# Patient Record
Sex: MALE | Race: WHITE | NOT HISPANIC OR LATINO | URBAN - METROPOLITAN AREA
[De-identification: names, ages, dates, MRNs, and addresses within clinical notes are randomized per-mention and may not be internally consistent; named-entity substitution may affect disease eponyms.]

---

## 2022-08-04 ENCOUNTER — EMERGENCY (EMERGENCY)
Facility: HOSPITAL | Age: 47
LOS: 1 days | Discharge: HOME | End: 2022-08-04
Attending: EMERGENCY MEDICINE
Payer: COMMERCIAL

## 2022-08-04 VITALS
WEIGHT: 220.02 LBS | TEMPERATURE: 100 F | OXYGEN SATURATION: 95 % | SYSTOLIC BLOOD PRESSURE: 142 MMHG | DIASTOLIC BLOOD PRESSURE: 95 MMHG | HEIGHT: 71 IN | HEART RATE: 100 BPM | RESPIRATION RATE: 18 BRPM

## 2022-08-05 LAB
ALBUMIN SERPL ELPH-MCNC: 4.8 G/DL — SIGNIFICANT CHANGE UP (ref 3.3–5)
ALP SERPL-CCNC: 117 U/L — SIGNIFICANT CHANGE UP (ref 40–120)
ALT FLD-CCNC: 76 U/L — HIGH (ref 10–45)
ANION GAP SERPL CALC-SCNC: 10 MMOL/L — SIGNIFICANT CHANGE UP (ref 5–17)
ANION GAP SERPL CALC-SCNC: 11 MMOL/L — SIGNIFICANT CHANGE UP (ref 5–17)
ANION GAP SERPL CALC-SCNC: 14 MMOL/L — SIGNIFICANT CHANGE UP (ref 5–17)
APPEARANCE UR: CLEAR — SIGNIFICANT CHANGE UP
AST SERPL-CCNC: 25 U/L — SIGNIFICANT CHANGE UP (ref 10–40)
BACTERIA # UR AUTO: NEGATIVE — SIGNIFICANT CHANGE UP
BASOPHILS # BLD AUTO: 0.02 K/UL — SIGNIFICANT CHANGE UP (ref 0–0.2)
BASOPHILS NFR BLD AUTO: 0.1 % — SIGNIFICANT CHANGE UP (ref 0–2)
BILIRUB SERPL-MCNC: 1.5 MG/DL — HIGH (ref 0.2–1.2)
BILIRUB UR-MCNC: NEGATIVE — SIGNIFICANT CHANGE UP
BUN SERPL-MCNC: 18 MG/DL — SIGNIFICANT CHANGE UP (ref 7–23)
BUN SERPL-MCNC: 18 MG/DL — SIGNIFICANT CHANGE UP (ref 7–23)
BUN SERPL-MCNC: 24 MG/DL — HIGH (ref 7–23)
CALCIUM SERPL-MCNC: 8.8 MG/DL — SIGNIFICANT CHANGE UP (ref 8.4–10.5)
CALCIUM SERPL-MCNC: 9 MG/DL — SIGNIFICANT CHANGE UP (ref 8.4–10.5)
CALCIUM SERPL-MCNC: 9.5 MG/DL — SIGNIFICANT CHANGE UP (ref 8.4–10.5)
CHLORIDE SERPL-SCNC: 100 MMOL/L — SIGNIFICANT CHANGE UP (ref 96–108)
CHLORIDE SERPL-SCNC: 101 MMOL/L — SIGNIFICANT CHANGE UP (ref 96–108)
CHLORIDE SERPL-SCNC: 102 MMOL/L — SIGNIFICANT CHANGE UP (ref 96–108)
CO2 SERPL-SCNC: 25 MMOL/L — SIGNIFICANT CHANGE UP (ref 22–31)
CO2 SERPL-SCNC: 27 MMOL/L — SIGNIFICANT CHANGE UP (ref 22–31)
CO2 SERPL-SCNC: 27 MMOL/L — SIGNIFICANT CHANGE UP (ref 22–31)
COLOR SPEC: YELLOW — SIGNIFICANT CHANGE UP
CREAT SERPL-MCNC: 2.02 MG/DL — HIGH (ref 0.5–1.3)
CREAT SERPL-MCNC: 2.08 MG/DL — HIGH (ref 0.5–1.3)
CREAT SERPL-MCNC: 2.17 MG/DL — HIGH (ref 0.5–1.3)
DIFF PNL FLD: NEGATIVE — SIGNIFICANT CHANGE UP
EGFR: 37 ML/MIN/1.73M2 — LOW
EGFR: 39 ML/MIN/1.73M2 — LOW
EGFR: 40 ML/MIN/1.73M2 — LOW
EOSINOPHIL # BLD AUTO: 0.01 K/UL — SIGNIFICANT CHANGE UP (ref 0–0.5)
EOSINOPHIL NFR BLD AUTO: 0.1 % — SIGNIFICANT CHANGE UP (ref 0–6)
EPI CELLS # UR: 0 /HPF — SIGNIFICANT CHANGE UP
GLUCOSE SERPL-MCNC: 126 MG/DL — HIGH (ref 70–99)
GLUCOSE SERPL-MCNC: 141 MG/DL — HIGH (ref 70–99)
GLUCOSE SERPL-MCNC: 159 MG/DL — HIGH (ref 70–99)
GLUCOSE UR QL: ABNORMAL
HCT VFR BLD CALC: 46.7 % — SIGNIFICANT CHANGE UP (ref 39–50)
HGB BLD-MCNC: 15.6 G/DL — SIGNIFICANT CHANGE UP (ref 13–17)
HYALINE CASTS # UR AUTO: 1 /LPF — SIGNIFICANT CHANGE UP (ref 0–2)
IMM GRANULOCYTES NFR BLD AUTO: 0.5 % — SIGNIFICANT CHANGE UP (ref 0–1.5)
KETONES UR-MCNC: ABNORMAL
LEUKOCYTE ESTERASE UR-ACNC: NEGATIVE — SIGNIFICANT CHANGE UP
LYMPHOCYTES # BLD AUTO: 1.52 K/UL — SIGNIFICANT CHANGE UP (ref 1–3.3)
LYMPHOCYTES # BLD AUTO: 11 % — LOW (ref 13–44)
MCHC RBC-ENTMCNC: 30.5 PG — SIGNIFICANT CHANGE UP (ref 27–34)
MCHC RBC-ENTMCNC: 33.4 GM/DL — SIGNIFICANT CHANGE UP (ref 32–36)
MCV RBC AUTO: 91.4 FL — SIGNIFICANT CHANGE UP (ref 80–100)
MONOCYTES # BLD AUTO: 0.69 K/UL — SIGNIFICANT CHANGE UP (ref 0–0.9)
MONOCYTES NFR BLD AUTO: 5 % — SIGNIFICANT CHANGE UP (ref 2–14)
NEUTROPHILS # BLD AUTO: 11.56 K/UL — HIGH (ref 1.8–7.4)
NEUTROPHILS NFR BLD AUTO: 83.3 % — HIGH (ref 43–77)
NITRITE UR-MCNC: NEGATIVE — SIGNIFICANT CHANGE UP
NRBC # BLD: 0 /100 WBCS — SIGNIFICANT CHANGE UP (ref 0–0)
PH UR: 6 — SIGNIFICANT CHANGE UP (ref 5–8)
PLATELET # BLD AUTO: 183 K/UL — SIGNIFICANT CHANGE UP (ref 150–400)
POTASSIUM SERPL-MCNC: 3.5 MMOL/L — SIGNIFICANT CHANGE UP (ref 3.5–5.3)
POTASSIUM SERPL-MCNC: 3.6 MMOL/L — SIGNIFICANT CHANGE UP (ref 3.5–5.3)
POTASSIUM SERPL-MCNC: 3.6 MMOL/L — SIGNIFICANT CHANGE UP (ref 3.5–5.3)
POTASSIUM SERPL-SCNC: 3.5 MMOL/L — SIGNIFICANT CHANGE UP (ref 3.5–5.3)
POTASSIUM SERPL-SCNC: 3.6 MMOL/L — SIGNIFICANT CHANGE UP (ref 3.5–5.3)
POTASSIUM SERPL-SCNC: 3.6 MMOL/L — SIGNIFICANT CHANGE UP (ref 3.5–5.3)
PROT SERPL-MCNC: 7 G/DL — SIGNIFICANT CHANGE UP (ref 6–8.3)
PROT UR-MCNC: ABNORMAL
RBC # BLD: 5.11 M/UL — SIGNIFICANT CHANGE UP (ref 4.2–5.8)
RBC # FLD: 12.6 % — SIGNIFICANT CHANGE UP (ref 10.3–14.5)
RBC CASTS # UR COMP ASSIST: 1 /HPF — SIGNIFICANT CHANGE UP (ref 0–4)
SARS-COV-2 RNA SPEC QL NAA+PROBE: SIGNIFICANT CHANGE UP
SODIUM SERPL-SCNC: 139 MMOL/L — SIGNIFICANT CHANGE UP (ref 135–145)
SP GR SPEC: 1.03 — HIGH (ref 1.01–1.02)
UROBILINOGEN FLD QL: NEGATIVE — SIGNIFICANT CHANGE UP
WBC # BLD: 13.87 K/UL — HIGH (ref 3.8–10.5)
WBC # FLD AUTO: 13.87 K/UL — HIGH (ref 3.8–10.5)
WBC UR QL: 1 /HPF — SIGNIFICANT CHANGE UP (ref 0–5)

## 2022-08-05 PROCEDURE — 74177 CT ABD & PELVIS W/CONTRAST: CPT | Mod: 26,MA

## 2022-08-05 PROCEDURE — 99218: CPT

## 2022-08-05 RX ORDER — CEFTRIAXONE 500 MG/1
1000 INJECTION, POWDER, FOR SOLUTION INTRAMUSCULAR; INTRAVENOUS ONCE
Refills: 0 | Status: COMPLETED | OUTPATIENT
Start: 2022-08-05 | End: 2022-08-05

## 2022-08-05 RX ORDER — SODIUM CHLORIDE 9 MG/ML
1000 INJECTION, SOLUTION INTRAVENOUS ONCE
Refills: 0 | Status: COMPLETED | OUTPATIENT
Start: 2022-08-05 | End: 2022-08-05

## 2022-08-05 RX ORDER — KETOROLAC TROMETHAMINE 30 MG/ML
15 SYRINGE (ML) INJECTION ONCE
Refills: 0 | Status: DISCONTINUED | OUTPATIENT
Start: 2022-08-05 | End: 2022-08-05

## 2022-08-05 RX ORDER — CEFTRIAXONE 500 MG/1
1000 INJECTION, POWDER, FOR SOLUTION INTRAMUSCULAR; INTRAVENOUS EVERY 24 HOURS
Refills: 0 | Status: DISCONTINUED | OUTPATIENT
Start: 2022-08-06 | End: 2022-08-06

## 2022-08-05 RX ORDER — SODIUM CHLORIDE 9 MG/ML
1000 INJECTION, SOLUTION INTRAVENOUS
Refills: 0 | Status: DISCONTINUED | OUTPATIENT
Start: 2022-08-05 | End: 2022-08-08

## 2022-08-05 RX ORDER — TAMSULOSIN HYDROCHLORIDE 0.4 MG/1
0.4 CAPSULE ORAL
Refills: 0 | Status: DISCONTINUED | OUTPATIENT
Start: 2022-08-05 | End: 2022-08-08

## 2022-08-05 RX ADMIN — TAMSULOSIN HYDROCHLORIDE 0.4 MILLIGRAM(S): 0.4 CAPSULE ORAL at 14:41

## 2022-08-05 RX ADMIN — SODIUM CHLORIDE 100 MILLILITER(S): 9 INJECTION, SOLUTION INTRAVENOUS at 10:34

## 2022-08-05 RX ADMIN — SODIUM CHLORIDE 1000 MILLILITER(S): 9 INJECTION, SOLUTION INTRAVENOUS at 04:07

## 2022-08-05 RX ADMIN — CEFTRIAXONE 100 MILLIGRAM(S): 500 INJECTION, POWDER, FOR SOLUTION INTRAMUSCULAR; INTRAVENOUS at 08:04

## 2022-08-05 RX ADMIN — Medication 15 MILLIGRAM(S): at 02:22

## 2022-08-05 RX ADMIN — SODIUM CHLORIDE 1000 MILLILITER(S): 9 INJECTION, SOLUTION INTRAVENOUS at 02:19

## 2022-08-05 NOTE — ED CDU PROVIDER DISPOSITION NOTE - CARE PROVIDER_API CALL
Anoop Carey  Urology  [address] [place] [address]  Phone: [phone]  Fax: [phone]  Follow Up Time:

## 2022-08-05 NOTE — ED ADULT NURSE NOTE - OBJECTIVE STATEMENT
PT is 46 y/o male, presenting to the ED c/o abdominal pain x1 day. As per pt, abdominal pain began last night and states "the pain was so bad it made me throw up". Pt states he was not able to eat due to fear of worsening pain. Denies dysuria or burning upon urination. Pt states he does not have a PCP and has not been to the dr in over 5 years. Denies any pertinent PMH or daily medication use. Upon assessment, pt AxOx3, sitting up in stretcher, and speaking in full sentences. Breathing spontaneously and unlabored. Abdomen is soft and non-tender, pt endorses pain to be Left flank that radiates to LLQ. Pt ambulates w/o difficultly, and moves all extremities w/ = strength. Skin is warm, dry, and intact w/ + peripheral pulses. Pt denies chest pain, SOB, n/v/d, dizziness, chills, and fevers. Safety and comfort measures provided- bed in lowest position, locked, and blanket given.

## 2022-08-05 NOTE — ED PROVIDER NOTE - PATIENT PORTAL LINK FT
You can access the FollowMyHealth Patient Portal offered by [facility] by registering at the following website: [email or web address]. By joining [employer]’s FollowMyHealth portal, you will also be able to view your health information using other applications (apps) compatible with our system.

## 2022-08-05 NOTE — CONSULT NOTE ADULT - SUBJECTIVE AND OBJECTIVE BOX
HPI:  Patient is a 47y Male who presented without past medical hx. Here for intractable left flank pain. Pain initially started two weeks ago with a few hrs of severe left flank pain that self resolved. No pain again until two nights prior. Similar to past. Went to urgent care yesterday and was told to come to ER to r/o stone. + left flank pain radiating to groin. Intractable unrelieved with oral medications. Has never had before + nausea and vomiting.   No fevers/ chills/ changes in urinary sx - dysuria hematuria urgency or freq    PAST MEDICAL & SURGICAL HISTORY:  denies     FAMILY HISTORY:   No known  malignancy     SOCIAL HISTORY: no tobacco     MEDICATIONS  (STANDING):  denies   MEDICATIONS  (PRN):    Allergies    No Known Allergies    Intolerances    REVIEW OF SYSTEMS: Pertinent positives and negatives as stated in HPI, otherwise negative    Vital signs  T(C): 36.9 (22 @ 07:25), Max: 38 (22 @ 02:04)  HR: 73 (22 @ 07:25)  BP: 127/65 (22 @ 07:25)  SpO2: 97% (22 @ 07:25)  Wt(kg): --    Physical Exam    Gen: awake alert NAD NONTOXIC APPEARING     HEENT: normocephalic, atraumatic, no scleral icterus    Pulm: No respiratory distress, no subcostal retractions, no accessory muscle use     CV: S1 S2,    Abd: obese Soft, NT, ND,    : nonpalp bladder no suprapubic tenderness     MSK:  No CVAT  Moving all extremities, full ROM in all extremities, No edema present    NEURO: A&Ox3, no focal neurological deficits, CN 2-12 grossly intact    SKIN: without  rash    LABS:                          15.6   13.87 )-----------( 183      ( 05 Aug 2022 02:31 )             46.7     -    139  |  101  |  18  ----------------------------<  141<H>  3.5   |  27  |  2.02<H>    Ca    8.8      05 Aug 2022 05:39    TPro  7.0  /  Alb  4.8  /  TBili  1.5<H>  /  DBili  x   /  AST  25  /  ALT  76<H>  /  AlkPhos  117  08-05      Urinalysis Basic - ( 05 Aug 2022 02:32 )    Color: Yellow / Appearance: Clear / S.026 / pH: x  Gluc: x / Ketone: Moderate  / Bili: Negative / Urobili: Negative   Blood: x / Protein: Trace / Nitrite: Negative   Leuk Esterase: Negative / RBC: 1 /hpf / WBC 1 /HPF   Sq Epi: x / Non Sq Epi: 0 /hpf / Bacteria: Negative        Urine Cx: sent and rec   Blood Cx: sent and rec     Radiology:  < from: CT Abdomen and Pelvis w/ IV Cont (22 @ 03:00) >  FINDINGS:  LOWER CHEST: Within normal limits.    LIVER: Fatty.  BILE DUCTS: Normal caliber.  GALLBLADDER: Within normal limits.  SPLEEN: Within normal limits.  PANCREAS: Atrophic.  ADRENALS: Within normal limits.  KIDNEYS/URETERS: Mild left hydroureteronephrosis to the level of a 0.2 cm   stone at the ureterovesical junction. Mild delayed enhancement of the   left kidney and perinephric fat stranding. No right hydronephrosis. There   is a punctate nonobstructive right renal stone.    BLADDER: Underdistended.  REPRODUCTIVE ORGANS: Prostate within normal limits.    BOWEL: No bowel obstruction. Appendix is normal. The colon is   underdistended without significant fecal load. No convincing focal bowel   wall thickening or diverticulitis.  PERITONEUM: No ascites.  VESSELS: Within normal limits.  RETROPERITONEUM/LYMPH NODES: No lymphadenopathy.  ABDOMINAL WALL: Small fat-containing umbilical hernia.  BONES: Degenerative changes.    IMPRESSION:  Mild left hydroureteronephrosis to the level of a 0.2 cm stone at the   ureterovesical junction.      < end of copied text >

## 2022-08-05 NOTE — ED ADULT NURSE REASSESSMENT NOTE - NS ED NURSE REASSESS COMMENT FT1
Assumed  of pt resting in bed, NAD, no complaints offered at this time.
Received pt from ANA Young , received pt alert and responsive, oriented x4, denies any respiratory distress, SOB, or difficulty breathing. Pt transferred to CDU for L flank pain. Pt is pain free at this time, resting comfortably and able to tolerate PO. Received on IVF tolerating well. IV in place, patent and free of signs of infiltration,  pt denies chest pain or palpitations, V/S stable, pt afebrile, pt denies pain at this time. Pt educated on unit and unit rules, instructed patient to notify RN of any needed assistance, Pt verbalizes understanding, Call bell placed within reach. Safety maintained. Will continue to monitor.

## 2022-08-05 NOTE — ED PROVIDER NOTE - CLINICAL SUMMARY MEDICAL DECISION MAKING FREE TEXT BOX
L flank, LLQ, L test pain x 24 hours, intermittent, severe, a/w low grade fever.  Similar episodes 2 wk ago.  No urinary symptoms but hpi c/f urolithiasis/pyelo/UTI.  Possible diverticulitis.  Doubt dissection.  Needs CTAP, test US (testicular tenderness to palpation reported from [facility]), labs, UA/Cx, pain Rx, reassess.  --BMM

## 2022-08-05 NOTE — ED CDU PROVIDER INITIAL DAY NOTE - OBJECTIVE STATEMENT
46yo M no known PMH, has not had a PCP f/u in 5 yrs, no surgical history, no daily meds p/w abd pain. L sided. L flank. Radiating to the L groin. Onset 10pm today. Went to , urine performed, asked to come to the ED for further eval. No prior colonoscopy. No F/C/NS/N/V/D. Similar episode to the R side couple of months ago self resolved. Pain improved w urination.

## 2022-08-05 NOTE — ED CDU PROVIDER DISPOSITION NOTE - PATIENT PORTAL LINK FT
You can access the FollowMyHealth Patient Portal offered by [facility] by registering at the following website: [email or web address]. By joining [employer]’s FollowMyHealth portal, you will also be able to view your health information using other applications (apps) compatible with our system.

## 2022-08-05 NOTE — ED CDU PROVIDER DISPOSITION NOTE - NSFOLLOWUPINSTRUCTIONS_ED_ALL_ED_FT
You were seen and evaluated in the ED for a kidney stone.   Please make sure to follow up with your primary care doctor within 1-2 days and with the Urology specialist. The information for follow up can be found below. Bring a copy of all of your results with you to your follow up appointments.   Return to the ER as discussed if you develop any new or worsening symptoms.   You may take tylenol 1000mg and ibuprofen 400mg every 6 hours as needed for pain.   Please take the Flomax nightly at bedtime as prescribed.  Continue the antibiotics and complete the entire course of medication.      Anoop Carey  Urology  [address] [place] [address]  Phone: [phone]  Fax: [phone] You were seen and evaluated in the ED for a kidney stone.   Please make sure to follow up with your primary care doctor within 1-2 days and with the Urology specialist. The information for follow up can be found below. Bring a copy of all of your results with you to your follow up appointments.   Return to the ER as discussed if you develop any new or worsening symptoms.   You may take tylenol 1000mg and ibuprofen 400mg every 6 hours as needed for pain. Take Oxycodone 5mg every 8 hours as needed for pain if no improvement with over the counter. Do not drive or consume alcohol while taking.   Please take the Flomax nightly at bedtime as prescribed.  Continue the antibiotics and complete the entire course of medication.      Anoop Carey  Urology  [address] [place] [address]  Phone: [phone]  Fax: [phone]

## 2022-08-05 NOTE — ED PROVIDER NOTE - NSFOLLOWUPCLINICS_GEN_ALL_ED_FT
[facility] - Primary Care  Primary Care  [address] [place] [address]  Phone: [phone]  Fax:     [facility]  Nephrology  [address] [place] [address]  Phone: [phone]  Fax:     [facility]  [facility] [address] [place] [address]  Phone: [phone]  Fax:

## 2022-08-05 NOTE — ED CDU PROVIDER INITIAL DAY NOTE - NS ED ATTENDING STATEMENT MOD
This was a shared visit with the APRIL. I reviewed and verified the documentation and independently performed the documented:

## 2022-08-05 NOTE — ED CDU PROVIDER DISPOSITION NOTE - CLINICAL COURSE
46yo M no known PMH, has not had a PCP f/u in 5 yrs, no surgical history, no daily meds p/w abd pain. L sided. L flank. Radiating to the L groin. Onset 10pm today. Went to , urine performed, asked to come to the ED for further eval. No prior colonoscopy. No F/C/NS/N/V/D. Similar episode to the R side couple of months ago self resolved. Pain improved w urination.  In the ED, pt with stable VS. ?Low grade temp orally upon arrival to ED however, Afebrile rectally at most recent temp check this AM, 8/5/22. Labs remarkable for a leukocytosis of 13.87, cr 2.08 and repeat 2.02. UA WNL. Pt was evaluated by  who recommended high dose flomax, aggressive IVF, strain urine and continued obs. Will trend creatinine as well. Plan to place pt in CDU for additional management.  In the CDU*** 48yo M no known PMH, has not had a PCP f/u in 5 yrs, no surgical history, no daily meds p/w abd pain. L sided. L flank. Radiating to the L groin. Onset 10pm today. Went to , urine performed, asked to come to the ED for further eval. No prior colonoscopy. No F/C/NS/N/V/D. Similar episode to the R side couple of months ago self resolved. Pain improved w urination.  In the ED, pt with stable VS. ?Low grade temp orally upon arrival to ED however, Afebrile rectally at most recent temp check this AM, 8/5/22. Labs remarkable for a leukocytosis of 13.87, cr 2.08 and repeat 2.02. UA WNL. Pt was evaluated by  who recommended high dose flomax, aggressive IVF, strain urine and continued obs. Will trend creatinine as well. Plan to place pt in CDU for additional management.  In the CDU pain remained well controlled and tolerated PO. cleared for discharge home per urology and ED attending Dr. Unger

## 2022-08-05 NOTE — ED PROVIDER NOTE - PHYSICAL EXAMINATION
G: NAD, cooperative with exam   H: NCAT  E: EOMI   M: Mucous membranes moist   R: CTABL, nWOB  C: RRR  A: Soft, NT/ND, no rebound/guarding, no CVA tenderness

## 2022-08-05 NOTE — ED PROVIDER NOTE - NS ED ROS FT
Gen: No F/C/NS  Head: No falls/head trauma  Eyes: No changes in vision   Resp: No cough or trouble breathing  Cardiovascular: No chest pain or palpitation  Gastroenteric: No N/V/D  :  No change in urine output, dysuria or hematuria   MS: +L flank pain   Neuro: No headache

## 2022-08-05 NOTE — ED PROVIDER NOTE - PROGRESS NOTE DETAILS
Delmi Siu MD (PGY3) -  Pt seen & reassessed.  Pt symptomatically improved.  NAD, pt tolerated PO & ambulated w/steady unassisted gait in the ED.  We discussed the results of ED w/u w/patient (incl. presumptive Emergency Department dx, associated anticipatory guidance, stressing importance of prompt f/u, return precautions), & gave them a copy of results.  Patient verbalized understanding of ED course & agreed with our f/u recommendations, has decisional making capacity.  Pt st they will f/u w/PMD. Pt agrees to return to the ED if there is any worsening or concerning symptoms. Beltran, PGY3: Received care upon signout; 46yo M w/ 0.2cm stone, febrile and creatine of 2. Uro evaluated; awaiting finals recs but will get blood cultures + begin pt on rocephan Beltran, PGY3: Uro requesting admission vs CDU; discussed w/ patient and will send for obs

## 2022-08-05 NOTE — CONSULT NOTE ADULT - ASSESSMENT
48 yo male with 2mm left uvj stone; AYO with unknown baseline; solitary fever   Pt appears nontoxic and ua without evidence of infection  Will observe in hospital for 24 hrs and monitor for fever while attempting medical expulsive therapy     aggressive hydration  double dose flomax  strain urine as stone is small   pain control and antiemetics prn   vitals inc temperature q 4  will follow   dw .Dr. Carey

## 2022-08-05 NOTE — ED PROVIDER NOTE - NSFOLLOWUPINSTRUCTIONS_ED_ALL_ED_FT
You have been diagnosed with a kidney stone. To control the pain, you should take Ibuprofen 400 mg along with Tylenol 650mg every 6 hours. These are both over the counter medications that you can  at your local pharmacy without a prescription. We also sent a stronger pain medication to your pharmacy that you should only take when you are still having pain despite trying Tylenol and Ibuprofen. If you are still having severe pain in 48 hours you should return to the emergency room or a urologist if able. If you began having fever, uncontrollable nausea and vomiting then you also need to come back to the ED.    Take one tablet of Flomax once per day.     Follow up with a urologist.     Follow up with a primary care doctor. Your kidney numbers (namely Creatinine) is elevated. Please ensure you receive proper evaluation regarding this abnormality.    You can use 500-1000mg Tylenol every 6 hours for pain - as needed.  This is an over-the-counter medications - please respect the warnings on the label. This medication come with certain risks and side effects that you need to discuss with your doctor, especially if you are taking it for a prolonged period.    -- We have sent a prescription for Oxycodone directly to your pharmacy.  Please  the prescription as soon as possible & use as directed (1 pill every 6 hours ONLY as needed for SEVERE pain).  Try to use the Oxycodone as infrequently as possible - it's a narcotic & potentially addicting, especially if not taken as prescribed.  -- The narcotic in Oxycodone may cause drowsiness so don't drive, operate machinery or make important decisions while on this medication.  -- Oxycodone very often causes constipation.  So stay hydrated when taking Oxycodone. Your'e encouraged to use the stool softener Colace (also called docusate sodium – can be purchased without a prescription) 100mg 3 times a day to avoid constipation.

## 2022-08-05 NOTE — ED CDU PROVIDER INITIAL DAY NOTE - PHYSICAL EXAMINATION
CONSTITUTIONAL: Well appearing and in no apparent distress.  ENT: Airway patent, moist mucous membranes.   EYES: Pupils equal, round and reactive to light. EOMI. Conjunctiva normal appearing.   CARDIAC: Normal rate, regular rhythm.  Heart sounds S1, S2.    RESPIRATORY: Breath sounds clear and equal bilaterally.   GASTROINTESTINAL: Abdomen soft, non-tender, not distended. NO CVAT bilaterally.   Rectal temp 99.7, chaperoned by nurse Mantilla  MUSCULOSKELETAL: Spine appears normal.  NEUROLOGICAL: Alert and oriented x3, no focal deficits, no motor or sensory deficits. 5/5 muscle strength throughout.  SKIN: Skin normal color, warm, dry and intact.   PSYCHIATRIC: Normal mood and affect.

## 2022-08-05 NOTE — ED CDU PROVIDER INITIAL DAY NOTE - PROGRESS NOTE DETAILS
Spoke with , recommending high dose flomax  Advised giving 0.4mg BID  Kristin Camilo PA-C I was called to bathroom by patient after he urinated. ?Black stone in toilet. Urine specimen collector/strainer not used at the time. Patient without complaints.   Creatine mildly increased, will repeat in the morning and continue hydration. Patient was able to tolerate PO- ate dinner. - Delmy Flores PA-C

## 2022-08-05 NOTE — ED CLERICAL - BED REQUESTED
1500- Pt requesting to go to bathroom  pt advised not to get out of bed, pt removed her leads and walked to bathroom by herself
05-Aug-2022 08:55

## 2022-08-05 NOTE — ED CDU PROVIDER DISPOSITION NOTE - ATTENDING APP SHARED VISIT CONTRIBUTION OF CARE
I, Primo Unger, performed a history and physical exam of the patient and discussed their management with the resident and/or advanced care provider. I reviewed the resident and/or advanced care provider's note and agree with the documented findings and plan of care except where noted. I was present and available for all procedures.     Primo Unger MD. pt placed in CDU for renal colic. Cr improving. pt reports subjective improvement of his pain. pt thinks he may have passed a stone in the bathroom here. evaluated by urology, cleared for outpt dc; no acute surgical intervention. no need for abx as here, wbc improving, afebrile, ucx negative. pt abd soft, nt nd. no cva tenderness. will dc home, output uro f/u, analgesia rx.

## 2022-08-06 VITALS
TEMPERATURE: 98 F | DIASTOLIC BLOOD PRESSURE: 81 MMHG | RESPIRATION RATE: 18 BRPM | SYSTOLIC BLOOD PRESSURE: 136 MMHG | HEART RATE: 75 BPM | OXYGEN SATURATION: 96 %

## 2022-08-06 LAB
ANION GAP SERPL CALC-SCNC: 12 MMOL/L — SIGNIFICANT CHANGE UP (ref 5–17)
BUN SERPL-MCNC: 17 MG/DL — SIGNIFICANT CHANGE UP (ref 7–23)
CALCIUM SERPL-MCNC: 9.1 MG/DL — SIGNIFICANT CHANGE UP (ref 8.4–10.5)
CHLORIDE SERPL-SCNC: 103 MMOL/L — SIGNIFICANT CHANGE UP (ref 96–108)
CO2 SERPL-SCNC: 25 MMOL/L — SIGNIFICANT CHANGE UP (ref 22–31)
CREAT SERPL-MCNC: 1.36 MG/DL — HIGH (ref 0.5–1.3)
CULTURE RESULTS: SIGNIFICANT CHANGE UP
EGFR: 65 ML/MIN/1.73M2 — SIGNIFICANT CHANGE UP
GLUCOSE SERPL-MCNC: 138 MG/DL — HIGH (ref 70–99)
HCT VFR BLD CALC: 44.2 % — SIGNIFICANT CHANGE UP (ref 39–50)
HGB BLD-MCNC: 15.4 G/DL — SIGNIFICANT CHANGE UP (ref 13–17)
MCHC RBC-ENTMCNC: 31.4 PG — SIGNIFICANT CHANGE UP (ref 27–34)
MCHC RBC-ENTMCNC: 34.8 GM/DL — SIGNIFICANT CHANGE UP (ref 32–36)
MCV RBC AUTO: 90.2 FL — SIGNIFICANT CHANGE UP (ref 80–100)
NRBC # BLD: 0 /100 WBCS — SIGNIFICANT CHANGE UP (ref 0–0)
PLATELET # BLD AUTO: 145 K/UL — LOW (ref 150–400)
POTASSIUM SERPL-MCNC: 3.6 MMOL/L — SIGNIFICANT CHANGE UP (ref 3.5–5.3)
POTASSIUM SERPL-SCNC: 3.6 MMOL/L — SIGNIFICANT CHANGE UP (ref 3.5–5.3)
RBC # BLD: 4.9 M/UL — SIGNIFICANT CHANGE UP (ref 4.2–5.8)
RBC # FLD: 12.6 % — SIGNIFICANT CHANGE UP (ref 10.3–14.5)
SODIUM SERPL-SCNC: 140 MMOL/L — SIGNIFICANT CHANGE UP (ref 135–145)
SPECIMEN SOURCE: SIGNIFICANT CHANGE UP
WBC # BLD: 8.55 K/UL — SIGNIFICANT CHANGE UP (ref 3.8–10.5)
WBC # FLD AUTO: 8.55 K/UL — SIGNIFICANT CHANGE UP (ref 3.8–10.5)

## 2022-08-06 PROCEDURE — 85025 COMPLETE CBC W/AUTO DIFF WBC: CPT

## 2022-08-06 PROCEDURE — G0378: CPT

## 2022-08-06 PROCEDURE — 85027 COMPLETE CBC AUTOMATED: CPT

## 2022-08-06 PROCEDURE — 87086 URINE CULTURE/COLONY COUNT: CPT

## 2022-08-06 PROCEDURE — 74177 CT ABD & PELVIS W/CONTRAST: CPT | Mod: MA

## 2022-08-06 PROCEDURE — 99217: CPT

## 2022-08-06 PROCEDURE — 81001 URINALYSIS AUTO W/SCOPE: CPT

## 2022-08-06 PROCEDURE — 80048 BASIC METABOLIC PNL TOTAL CA: CPT

## 2022-08-06 PROCEDURE — 80053 COMPREHEN METABOLIC PANEL: CPT

## 2022-08-06 PROCEDURE — 99284 EMERGENCY DEPT VISIT MOD MDM: CPT | Mod: 25

## 2022-08-06 PROCEDURE — 36415 COLL VENOUS BLD VENIPUNCTURE: CPT

## 2022-08-06 PROCEDURE — 87635 SARS-COV-2 COVID-19 AMP PRB: CPT

## 2022-08-06 PROCEDURE — 96375 TX/PRO/DX INJ NEW DRUG ADDON: CPT | Mod: XU

## 2022-08-06 PROCEDURE — 96374 THER/PROPH/DIAG INJ IV PUSH: CPT | Mod: XU

## 2022-08-06 PROCEDURE — 87040 BLOOD CULTURE FOR BACTERIA: CPT

## 2022-08-06 RX ORDER — TAMSULOSIN HYDROCHLORIDE 0.4 MG/1
1 CAPSULE ORAL
Qty: 7 | Refills: 0
Start: 2022-08-06 | End: 2022-08-12

## 2022-08-06 RX ORDER — OXYCODONE HYDROCHLORIDE 5 MG/1
1 TABLET ORAL
Qty: 9 | Refills: 0
Start: 2022-08-06 | End: 2022-08-08

## 2022-08-06 RX ADMIN — SODIUM CHLORIDE 1000 MILLILITER(S): 9 INJECTION, SOLUTION INTRAVENOUS at 06:29

## 2022-08-06 RX ADMIN — TAMSULOSIN HYDROCHLORIDE 0.4 MILLIGRAM(S): 0.4 CAPSULE ORAL at 03:21

## 2022-08-06 RX ADMIN — SODIUM CHLORIDE 100 MILLILITER(S): 9 INJECTION, SOLUTION INTRAVENOUS at 06:29

## 2022-08-06 NOTE — ED CDU PROVIDER SUBSEQUENT DAY NOTE - ATTENDING APP SHARED VISIT CONTRIBUTION OF CARE
I, Primo Unger, performed a history and physical exam of the patient and discussed their management with the resident and/or advanced care provider. I reviewed the resident and/or advanced care provider's note and agree with the documented findings and plan of care except where noted. I was present and available for all procedures.     see mdm

## 2022-08-06 NOTE — ED CDU PROVIDER SUBSEQUENT DAY NOTE - NS ED ROS FT
Constitutional: No fever or chills  Eyes: No visual changes, eye pain   CV: No chest pain or lower extremity edema  Resp: No SOB no cough  GI: + abd pain. No nausea or vomiting. No diarrhea.   : No dysuria, hematuria.   MSK: No musculoskeletal pain  Skin: No rash  Psych: No complaints   Neuro: No headache. No numbness or tingling.  Endo: No DM

## 2022-08-06 NOTE — ED CDU PROVIDER SUBSEQUENT DAY NOTE - MEDICAL DECISION MAKING DETAILS
Primo Unger MD. pt placed in CDU for renal colic. Cr improving. pt reports subjective improvement of his pain. pt thinks he may have passed a stone in the bathroom here. evaluated by urology, pending final recs no need for abx as here, wbc improving, afebrile, ucx negative. pt abd soft, nt nd. no cva tenderness.

## 2022-08-06 NOTE — ED CDU PROVIDER SUBSEQUENT DAY NOTE - PROGRESS NOTE DETAILS
JUAN MANUEL Martinez: Patient seen at bedside in NAD.  VSS.  Patient resting comfortably without complaints. tolerating PO pain well controlled. cleared for discharge home per ED attending Dr. Unger and urology

## 2022-08-06 NOTE — PROGRESS NOTE ADULT - SUBJECTIVE AND OBJECTIVE BOX
Patient presented with L flank pain and fever last night with 2mm stone seen at [facility] on CT abd/pelv.    Patient believes he passed the stone last night as he saw a black speck in the toilet after urination. No longer having any left flank/abd/groin pain. Denies nausea, vomiting.      PHYSICAL EXAM:    Vital Signs Last 24 Hrs  T(C): 36.6 (06 Aug 2022 04:30), Max: 37.6 (05 Aug 2022 08:45)  T(F): 97.8 (06 Aug 2022 04:30), Max: 99.7 (05 Aug 2022 08:45)  HR: 79 (06 Aug 2022 04:30) (73 - 96)  BP: 134/81 (06 Aug 2022 04:30) (116/72 - 151/69)  BP(mean): 82 (05 Aug 2022 07:25) (82 - 82)  RR: 16 (06 Aug 2022 04:30) (16 - 18)  SpO2: 96% (06 Aug 2022 04:30) (96% - 97%)    Parameters below as of 06 Aug 2022 04:30  Patient On (Oxygen Delivery Method): room air    Constitutional: A&O x3. NAD.   Eyes: EOMI  Neck: Supple  Respiratory: Normal respiratory rate, no use of accessory muscles  Cardiovascular: No peripheral edema  Gastrointestinal: Soft, nontender, nondistended  Genitourinary: Mild left CVA tenderness  Neurological: No focal deficit  Skin: No rash  Psychiatric: Appropriate mood and affect                            15.4   8.55  )-----------( 145      ( 06 Aug 2022 06:06 )             44.2   08-06    140  |  103  |  17  ----------------------------<  138<H>  3.6   |  25  |  1.36<H>    Ca    9.1      06 Aug 2022 06:06    TPro  7.0  /  Alb  4.8  /  TBili  1.5<H>  /  DBili  x   /  AST  25  /  ALT  76<H>  /  AlkPhos  117  08-05

## 2022-08-06 NOTE — PROGRESS NOTE ADULT - ASSESSMENT
48 yo male who denies a past medical history presented with left flank pain found to have a 2 mm stone at UVJ that possibly passed naturally.     Left kidney stone  - Febrile on admission with elevated white count at 14 and Cr ~2, all which are improved  - CT abd/pelv: 2mm stone at [facility]  - Possible natural passage of stone  PLAN  - No surgical intervention indicated at this time  - Encouraged fluid hydration, low salt diet  - OTC analgesics as needed  - Follow up outpatient

## 2022-08-06 NOTE — ED CDU PROVIDER SUBSEQUENT DAY NOTE - PHYSICAL EXAMINATION
CONSTITUTIONAL: Well appearing and in no apparent distress.  	ENT: Airway patent, moist mucous membranes.   	CARDIAC: Normal rate, regular rhythm.  Heart sounds S1, S2.    	RESPIRATORY: Breath sounds clear and equal bilaterally.   	GASTROINTESTINAL: Abdomen soft, non-tender, not distended. NO CVAT bilaterally.   	MUSCULOSKELETAL: Spine appears normal.  	NEUROLOGICAL: Alert and oriented x3, clear speech, moving all extremities, steady gait   	SKIN: Skin normal color, warm, dry and intact.   PSYCHIATRIC: Normal mood and affect.

## 2022-08-06 NOTE — ED CDU PROVIDER SUBSEQUENT DAY NOTE - HISTORY
No interval changes since initial CDU provider note. Pt feels well without complaint. NAD VSS. Plan to continue hydration with repeat BMP this morning in the setting of renal stone. Urology following. - JUAN MANUEL Flores

## 2022-08-10 LAB
CULTURE RESULTS: SIGNIFICANT CHANGE UP
CULTURE RESULTS: SIGNIFICANT CHANGE UP
SPECIMEN SOURCE: SIGNIFICANT CHANGE UP
SPECIMEN SOURCE: SIGNIFICANT CHANGE UP